# Patient Record
Sex: FEMALE | Race: WHITE | NOT HISPANIC OR LATINO | Employment: PART TIME | URBAN - METROPOLITAN AREA
[De-identification: names, ages, dates, MRNs, and addresses within clinical notes are randomized per-mention and may not be internally consistent; named-entity substitution may affect disease eponyms.]

---

## 2019-09-20 ENCOUNTER — OFFICE VISIT (OUTPATIENT)
Dept: RETAIL CLINIC | Facility: CLINIC | Age: 34
End: 2019-09-20

## 2019-09-20 VITALS
BODY MASS INDEX: 45.35 KG/M2 | WEIGHT: 231 LBS | HEART RATE: 107 BPM | DIASTOLIC BLOOD PRESSURE: 78 MMHG | TEMPERATURE: 98.1 F | SYSTOLIC BLOOD PRESSURE: 124 MMHG | RESPIRATION RATE: 24 BRPM | OXYGEN SATURATION: 98 % | HEIGHT: 60 IN

## 2019-09-20 DIAGNOSIS — L03.012 FELON OF FINGER OF LEFT HAND: Primary | ICD-10-CM

## 2019-09-20 PROCEDURE — 99213 OFFICE O/P EST LOW 20 MIN: CPT | Performed by: NURSE PRACTITIONER

## 2019-09-20 RX ORDER — KETOCONAZOLE 20 MG/G
CREAM TOPICAL
Refills: 0 | COMMUNITY
Start: 2019-08-21

## 2019-09-20 RX ORDER — LAMOTRIGINE 200 MG/1
200 TABLET ORAL DAILY
Refills: 0 | COMMUNITY
Start: 2019-08-29

## 2019-09-20 RX ORDER — CLINDAMYCIN PHOSPHATE 11.9 MG/ML
SOLUTION TOPICAL
Refills: 0 | COMMUNITY
Start: 2019-08-21

## 2019-09-20 RX ORDER — BENZTROPINE MESYLATE 1 MG/1
TABLET ORAL
Refills: 0 | COMMUNITY
Start: 2019-07-30

## 2019-09-20 RX ORDER — SULFAMETHOXAZOLE AND TRIMETHOPRIM 800; 160 MG/1; MG/1
1 TABLET ORAL 2 TIMES DAILY
Qty: 20 TABLET | Refills: 0 | Status: SHIPPED | OUTPATIENT
Start: 2019-09-20 | End: 2019-09-30

## 2019-09-20 RX ORDER — PROPRANOLOL HYDROCHLORIDE 20 MG/1
TABLET ORAL
Refills: 0 | COMMUNITY
Start: 2019-07-07

## 2019-09-20 RX ORDER — VENLAFAXINE HYDROCHLORIDE 150 MG/1
300 CAPSULE, EXTENDED RELEASE ORAL DAILY
Refills: 0 | COMMUNITY
Start: 2019-09-15

## 2019-09-20 RX ORDER — BUSPIRONE HYDROCHLORIDE 30 MG/1
TABLET ORAL
Refills: 0 | COMMUNITY
Start: 2019-09-15

## 2019-09-20 RX ORDER — OLANZAPINE 20 MG/1
TABLET ORAL
Refills: 0 | COMMUNITY
Start: 2019-09-15

## 2019-09-20 RX ORDER — CLONAZEPAM 0.5 MG/1
0.5 TABLET ORAL DAILY PRN
Refills: 0 | COMMUNITY
Start: 2019-08-29

## 2019-09-20 RX ORDER — HALOPERIDOL 2 MG/1
TABLET ORAL
Refills: 0 | COMMUNITY
Start: 2019-07-10

## 2019-09-20 RX ORDER — BUPROPION HYDROCHLORIDE 150 MG/1
TABLET ORAL
Refills: 0 | COMMUNITY
Start: 2019-09-15

## 2019-09-20 NOTE — PATIENT INSTRUCTIONS
Fingertip Infection  There are two main types of fingertip infections:  · Paronychia. This is an infection that happens around your nail. This type of infection can start suddenly in one nail or occur gradually over time and affect more than one nail. Long-term (chronic) paronychia can make your fingernails thick and deformed.  · Felon. This is a bacterial infection in the padded tip of your finger. Felon infection can cause a painful collection of pus (abscess) to form inside your fingertip. If the infection is not treated, the infection can spread as deep as the bone.  What are the causes?  Paronychia infection can be caused by bacteria, funguses, or a mix of both. Felon infection is usually caused by the bacteria that are normally found on your skin. An infection can develop if the bacteria spread through your skin to the pad of tissue inside your fingertip.  What increases the risk?  A fingertip infection is more likely to develop in people:  · Who have diabetes.  · Who have a weak body defense (immune) system.  · Who work with their hands.  · Whose hands are exposed to moisture, chemicals, or irritants for long periods of time.  · Who have poor circulation.  · Who bite, chew, or pick their fingernails.  What are the signs or symptoms?  Symptoms of paronychia may affect one or more fingernails and include:  · Pain, swelling, and redness around the nail.  · Pus-filled pockets at the base or side of the fingernail (cuticle).  · Thick fingernails that separate from the nail bed.  · Pus that drains from the nail bed.  Symptoms of felon usually affect just one fingertip pad and include:  · Severe, throbbing pain.  · Redness.  · Swelling.  · Warmth.  · Tenderness when the affected fingertip is touched.  How is this diagnosed?  A fingertip infection is diagnosed with a medical history and physical exam. If there is pus draining from the infection, it may be swabbed and sent to the lab for a culture. An X-ray may be  done to see if the infection has spread to the bone.  How is this treated?  Treatment for a fingertip infection may include:  · Warm water or salt-water soaks several times per day.  · Antibiotic medicine. This may be an ointment or pills.  · Steroid ointment.  · Antifungal pills.  · Drainage of pus pockets. This is done by making a surgical cut (incision) to open the fingertip to drain pus.  · Wearing gloves to protect your nails.  Follow these instructions at home:  Medicines  · Take or apply over-the-counter and prescription medicines only as told by your health care provider.  · If you were prescribed antibiotic medicine, take or apply it as told by your health care provider. Do not stop using the antibiotic even if your condition improves.  Wound care  · Clean the infected area each day with warm water or salt water, or as told by your health care provider.  ? Gently wash the infected area with mild soap and water.  ? Rinse the infected area with water to remove all soap.  ? Pat the infected area dry with a clean towel. Do not rub it.  ? To make a salt-water mixture, completely dissolve ½-1 tsp of salt in 1 cup of warm water.  · Follow instructions from your health care provider about:  ? How to take care of the infection.  ? When and how you should change your bandage (dressing).  ? When you should remove your dressing.  · Check the infected area every day for more signs of infection. Watch for:  ? More redness, swelling, or pain.  ? More fluid or blood.  ? Warmth.  ? A bad smell.  General instructions  · Keep the dressing dry until your health care provider says it can be removed. Do not take baths, swim, use a hot tub, or do anything that would put your wound underwater until your health care provider approves.  · Raise (elevate) the infected area above the level of your heart while you are sitting or lying down or as told by your health care provider.  · Do not scratch or pick at the infection.  · Wear  gloves as told by your health care provider, if this applies.  · Keep all follow-up visits as told by your health care provider. This is important.  How is this prevented?  · Wear gloves when you work with your hands.  · Wash your hands often with antibacterial soap.  · Avoid letting your hands stay wet or irritated for long periods of time.  · Do not bite your fingernails. Do not pull on your cuticles. Do not suck on your fingers.  · Use clean scissors or nail clippers to trim your nails. Do not cut your fingernails very short.  Contact a health care provider if:  · Your pain medicine is not helping.  · You have more redness, swelling, or pain at your fingertip.  · You continue to have fluid, blood, or pus coming from your fingertip.  · Your infection area feels warm to the touch.  · You continue to notice a bad smell coming from your fingertip or your dressing.  Get help right away if:  · The area of redness is spreading, or you notice a red streak going away from your fingertip.  · You have a fever.  This information is not intended to replace advice given to you by your health care provider. Make sure you discuss any questions you have with your health care provider.  Document Released: 01/25/2006 Document Revised: 05/25/2017 Document Reviewed: 06/06/2016  Elsevier Interactive Patient Education © 2019 [employer]

## 2019-09-21 NOTE — PROGRESS NOTES
Subjective   Klaudia Baltazar is a 34 y.o. female.   Chief Complaint   Patient presents with   • Hand Pain      35 yo w/f presents with complaint of painful left middle finger tip after injury to finger tip and breaking nail approx 7 days ago.  Reports  gradually becoming more red and painful.  She has applied antibiotic ointment without relief.  Refer to HPI/ROS for additional information.               Hand Pain    The incident occurred 5 to 7 days ago. The incident occurred at home. The injury mechanism is unknown. Pain location: left middle finger. The quality of the pain is described as aching. The pain does not radiate. The pain is at a severity of 6/10. The pain has been fluctuating since the incident. The symptoms are aggravated by palpation and movement (allowing left hand to hang down). She has tried acetaminophen for the symptoms. The treatment provided no relief.        The following portions of the patient's history were reviewed and updated as appropriate: allergies, current medications, past family history, past medical history, past social history, past surgical history and problem list.    Current Outpatient Medications:   •  benztropine (COGENTIN) 1 MG tablet, take 0.5-1 tablet by mouth twice a day if needed, Disp: , Rfl: 0  •  buPROPion XL (WELLBUTRIN XL) 150 MG 24 hr tablet, , Disp: , Rfl: 0  •  busPIRone (BUSPAR) 30 MG tablet, , Disp: , Rfl: 0  •  clindamycin (CLEOCIN T) 1 % external solution, APPY TO ACNE ON FACE, CHEST, AND BACK IN THE MORNING, Disp: , Rfl: 0  •  clonazePAM (KlonoPIN) 0.5 MG tablet, Take 0.5 mg by mouth Daily As Needed., Disp: , Rfl: 0  •  haloperidol (HALDOL) 2 MG tablet, , Disp: , Rfl: 0  •  ketoconazole (NIZORAL) 2 % cream, apply to chest twice a day for 7 days, Disp: , Rfl: 0  •  lamoTRIgine (LaMICtal) 200 MG tablet, Take 200 mg by mouth Daily., Disp: , Rfl: 0  •  OLANZapine (zyPREXA) 20 MG tablet, , Disp: , Rfl: 0  •  propranolol (INDERAL) 20 MG tablet, , Disp: , Rfl: 0  •   "sulfamethoxazole-trimethoprim (BACTRIM DS) 800-160 MG per tablet, Take 1 tablet by mouth 2 (Two) Times a Day for 10 days., Disp: 20 tablet, Rfl: 0  •  venlafaxine XR (EFFEXOR-XR) 150 MG 24 hr capsule, Take 300 mg by mouth Daily., Disp: , Rfl: 0    Review of Systems   Constitutional: Negative.    HENT: Negative.    Eyes: Negative.    Respiratory: Negative.    Cardiovascular: Negative.    Gastrointestinal: Negative.    Skin:        Finger tip   Psychiatric/Behavioral: Negative.    All other systems reviewed and are negative.    /78   Pulse 107   Temp 98.1 °F (36.7 °C)   Resp 24   Ht 152.4 cm (60\")   Wt 105 kg (231 lb)   SpO2 98%   BMI 45.11 kg/m²     Objective   No Known Allergies    Physical Exam   Constitutional: She is oriented to person, place, and time. She appears well-developed and well-nourished. No distress.   HENT:   Head: Normocephalic.   Eyes: Conjunctivae are normal.   Neck: Normal range of motion. Neck supple.   Cardiovascular: Normal rate, regular rhythm and normal heart sounds.   Pulmonary/Chest: Effort normal and breath sounds normal.   Neurological: She is alert and oriented to person, place, and time.   Skin: Capillary refill takes less than 2 seconds. She is not diaphoretic. There is erythema.   Left middle finger tender to palpation, distal tip is erythematous and edematous, more on palm side of finger.   Psychiatric: She has a normal mood and affect. Her behavior is normal. Judgment normal.   Vitals reviewed.      Assessment/Plan   Klaudia was seen today for hand pain.    Diagnoses and all orders for this visit:    Felon of finger of left hand    Other orders  -     sulfamethoxazole-trimethoprim (BACTRIM DS) 800-160 MG per tablet; Take 1 tablet by mouth 2 (Two) Times a Day for 10 days.           An After Visit Summary was printed, reviewed, and given to the patient. Understanding verbalized and agrees with treatment plan.  If no improvement or becomes worse, follow up with primary or " go to [facility]/ER.          September 21, 2019 9:43 AM

## 2020-01-14 ENCOUNTER — HOSPITAL ENCOUNTER (EMERGENCY)
Facility: HOSPITAL | Age: 35
Discharge: HOME | End: 2020-01-14
Attending: EMERGENCY MEDICINE | Admitting: EMERGENCY MEDICINE

## 2020-01-14 VITALS
TEMPERATURE: 98.1 F | SYSTOLIC BLOOD PRESSURE: 148 MMHG | OXYGEN SATURATION: 98 % | BODY MASS INDEX: 26.5 KG/M2 | DIASTOLIC BLOOD PRESSURE: 87 MMHG | HEIGHT: 60 IN | WEIGHT: 135 LBS | RESPIRATION RATE: 20 BRPM | HEART RATE: 121 BPM

## 2020-01-14 DIAGNOSIS — J02.0 STREP PHARYNGITIS: Primary | ICD-10-CM

## 2020-01-14 LAB
FLUAV AG NPH QL: NEGATIVE
FLUBV AG NPH QL IA: NEGATIVE
S PYO AG THROAT QL: POSITIVE

## 2020-01-14 PROCEDURE — 99283 EMERGENCY DEPT VISIT LOW MDM: CPT

## 2020-01-14 PROCEDURE — 96372 THER/PROPH/DIAG INJ SC/IM: CPT

## 2020-01-14 PROCEDURE — 87804 INFLUENZA ASSAY W/OPTIC: CPT

## 2020-01-14 PROCEDURE — 25010000002 DEXAMETHASONE PER 1 MG: Performed by: PHYSICIAN ASSISTANT

## 2020-01-14 PROCEDURE — 87880 STREP A ASSAY W/OPTIC: CPT | Performed by: EMERGENCY MEDICINE

## 2020-01-14 RX ORDER — DEXAMETHASONE SODIUM PHOSPHATE 10 MG/ML
10 INJECTION INTRAMUSCULAR; INTRAVENOUS ONCE
Status: COMPLETED | OUTPATIENT
Start: 2020-01-14 | End: 2020-01-14

## 2020-01-14 RX ORDER — AMOXICILLIN 500 MG/1
1000 CAPSULE ORAL 2 TIMES DAILY
Qty: 40 CAPSULE | Refills: 0 | Status: SHIPPED | OUTPATIENT
Start: 2020-01-14 | End: 2020-01-24

## 2020-01-14 RX ORDER — IBUPROFEN 800 MG/1
800 TABLET ORAL ONCE
Status: COMPLETED | OUTPATIENT
Start: 2020-01-14 | End: 2020-01-14

## 2020-01-14 RX ADMIN — DEXAMETHASONE SODIUM PHOSPHATE 10 MG: 10 INJECTION INTRAMUSCULAR; INTRAVENOUS at 17:10

## 2020-01-14 RX ADMIN — IBUPROFEN 800 MG: 800 TABLET ORAL at 14:45

## 2020-01-14 NOTE — ED PROVIDER NOTES
Subjective   Ms. Klaudia Baltazar is a 34 y.o female presenting to the emergency department with complaints of flu symptoms. She complains of sore throat, shortness of breath, body aches, and fever since yesterday. She has used Tylenol and Motrin all day but denies relief. She did receive her flu shot this season. She denies runny nose, congestion, sick contacts, recent antibiotics, and smoking. There are no other acute symptoms at this time.       History provided by:  Patient  Flu Symptoms   Presenting symptoms: cough, fever, shortness of breath and sore throat    Presenting symptoms: no diarrhea, no headaches, no rhinorrhea and no vomiting    Severity:  Moderate  Onset quality:  Sudden  Duration:  2 days  Progression:  Worsening  Chronicity:  New  Worsened by:  Nothing  Ineffective treatments: Tylenol.  Associated symptoms: no ear pain and no congestion        Review of Systems   Constitutional: Positive for fever.        Body aches   HENT: Positive for sore throat. Negative for congestion, ear pain and rhinorrhea.    Respiratory: Positive for cough and shortness of breath.    Cardiovascular: Negative for chest pain.   Gastrointestinal: Negative for diarrhea and vomiting.   Skin: Negative for rash.   Neurological: Negative for headaches.   All other systems reviewed and are negative.      Past Medical History:   Diagnosis Date   • Anxiety    • Bipolar disorder (CMS/HCC)    • Cancer (CMS/[facility])     skin cancer   • Depression        No Known Allergies    No past surgical history on file.    No family history on file.    Social History     Socioeconomic History   • Marital status:      Spouse name: Not on file   • Number of children: Not on file   • Years of education: Not on file   • Highest education level: Not on file   Tobacco Use   • Smoking status: Former Smoker     Types: Cigars     Last attempt to quit: 2017     Years since quittin.4   Substance and Sexual Activity   • Alcohol use: No     Frequency:  Never   • Drug use: No   • Sexual activity: Defer         Objective   Physical Exam   Constitutional: She is oriented to person, place, and time. She appears well-developed and well-nourished. No distress.   HENT:   Head: Normocephalic and atraumatic.   Mouth/Throat: Oropharyngeal exudate and posterior oropharyngeal edema present. No tonsillar abscesses.   Patent airway; airway is intact. Bilateral pharynx edema and exudates. No obvious peritonsillar abscess   Eyes: Pupils are equal, round, and reactive to light. Conjunctivae and EOM are normal. No scleral icterus.   Neck: Normal range of motion.   Cardiovascular: Regular rhythm. Tachycardia present.   No murmur heard.  Pulmonary/Chest: Effort normal and breath sounds normal. No respiratory distress. She has no wheezes.   Abdominal: Soft. There is no tenderness.   Musculoskeletal: Normal range of motion. She exhibits no edema or tenderness.   Neurological: She is alert and oriented to person, place, and time.   Skin: Skin is warm and dry. She is not diaphoretic.   Psychiatric: She has a normal mood and affect. Her behavior is normal.   Nursing note and vitals reviewed.      Procedures         ED Course     Positive Strep. Pt given dose of steroids in ED and discharged home on Amoxicillin.       Recent Results (from the past 24 hour(s))   Influenza Antigen, Rapid - Swab, Nasopharynx    Collection Time: 01/14/20  2:41 PM   Result Value Ref Range    Influenza A Ag, EIA Negative Negative    Influenza B Ag, EIA Negative Negative   Rapid Strep A Screen - Swab, Throat    Collection Time: 01/14/20  4:06 PM   Result Value Ref Range    Strep A Ag Positive (A) Negative     Note: In addition to lab results from this visit, the labs listed above may include labs taken at another facility or during a different encounter within the last 24 hours. Please correlate lab times with ED admission and discharge times for further clarification of the services performed during this  "visit.    No orders to display     Vitals:    01/14/20 1435 01/14/20 1730   BP: 150/91 148/87   BP Location: Right arm Right arm   Patient Position: Sitting Sitting   Pulse: (!) 130 (!) 121   Resp: 20 20   Temp: (!) 102.9 °F (39.4 °C) 98.1 °F (36.7 °C)   TempSrc: Oral Oral   SpO2: 97% 98%   Weight: 61.2 kg (135 lb)    Height: 152.4 cm (60\")      Medications   ibuprofen (ADVIL,MOTRIN) tablet 800 mg (800 mg Oral Given 1/14/20 1445)   dexamethasone (DECADRON) injection 10 mg (10 mg Intramuscular Given 1/14/20 1710)     ECG/EMG Results (last 24 hours)     ** No results found for the last 24 hours. **        No orders to display                                               MDM    Final diagnoses:   Strep pharyngitis       Documentation assistance provided by dominique Damico.  Information recorded by the scribe was done at my direction and has been verified and validated by me.     Margie Damico  01/14/20 8025       Liliana Tyson PA  01/14/20 4367    "

## 2020-01-14 NOTE — DISCHARGE INSTRUCTIONS
Follow up with one of the [facility] Primary Care Providers below to setup primary care. If you need assistance coordinating a primary care appointment with a [facility] Primary Care Provider, please contact the Primary Care Coordinators at [phone] for appointment scheduling.    [facility], Primary Care   [address] , [address]   [place], [place] [address] [phone]    [facility] Endocrinology  [address], [address]  [place] [address] [phone]    [facility]  [address], [address]   [place] [address] [phone]    [facility]  [address], [address]  [place], [place] [address] [phone]    [facility], Primary Care,   [address], [address]   [place], [place] [address] [phone]    [facility]  [address]  [place], [place] [id number] [phone]    [facility], Primary Care  [address], [address]  [place], [place] [address] [phone]    [facility]  [address], [address]   [place] [address] [phone]    [facility], Primary Care  [address] [place] [address] [phone]      [facility]  [address], [address]   [place] [address] [phone]    [facility]  [address] [place] [address] [phone]

## 2020-12-11 ENCOUNTER — CONSULT (OUTPATIENT)
Dept: ONCOLOGY | Facility: CLINIC | Age: 35
End: 2020-12-11

## 2020-12-11 ENCOUNTER — LAB (OUTPATIENT)
Dept: LAB | Facility: HOSPITAL | Age: 35
End: 2020-12-11

## 2020-12-11 VITALS
HEART RATE: 104 BPM | TEMPERATURE: 96.4 F | OXYGEN SATURATION: 99 % | HEIGHT: 61 IN | RESPIRATION RATE: 18 BRPM | SYSTOLIC BLOOD PRESSURE: 140 MMHG | WEIGHT: 217 LBS | DIASTOLIC BLOOD PRESSURE: 95 MMHG | BODY MASS INDEX: 40.97 KG/M2

## 2020-12-11 DIAGNOSIS — D75.839 THROMBOCYTOSIS: Primary | ICD-10-CM

## 2020-12-11 DIAGNOSIS — D75.839 THROMBOCYTOSIS: ICD-10-CM

## 2020-12-11 LAB
CRP SERPL-MCNC: 2.45 MG/DL (ref 0–0.5)
ERYTHROCYTE [DISTWIDTH] IN BLOOD BY AUTOMATED COUNT: 17.4 % (ref 12.3–15.4)
ERYTHROCYTE [SEDIMENTATION RATE] IN BLOOD: 34 MM/HR (ref 0–20)
FERRITIN SERPL-MCNC: 27.29 NG/ML (ref 13–150)
HCT VFR BLD AUTO: 40.9 % (ref 34–46.6)
HGB BLD-MCNC: 13.1 G/DL (ref 12–15.9)
IRON 24H UR-MRATE: 77 MCG/DL (ref 37–145)
IRON SATN MFR SERPL: 16 % (ref 20–50)
LYMPHOCYTES # BLD AUTO: 4.5 10*3/MM3 (ref 0.7–3.1)
LYMPHOCYTES NFR BLD AUTO: 39.4 % (ref 19.6–45.3)
MCH RBC QN AUTO: 29.3 PG (ref 26.6–33)
MCHC RBC AUTO-ENTMCNC: 32.1 G/DL (ref 31.5–35.7)
MCV RBC AUTO: 91.3 FL (ref 79–97)
MONOCYTES # BLD AUTO: 0.8 10*3/MM3 (ref 0.1–0.9)
MONOCYTES NFR BLD AUTO: 7.2 % (ref 5–12)
NEUTROPHILS NFR BLD AUTO: 53.4 % (ref 42.7–76)
NEUTROPHILS NFR BLD AUTO: 6 10*3/MM3 (ref 1.7–7)
PLATELET # BLD AUTO: 529 10*3/MM3 (ref 140–450)
PMV BLD AUTO: 7.4 FL (ref 6–12)
RBC # BLD AUTO: 4.48 10*6/MM3 (ref 3.77–5.28)
TIBC SERPL-MCNC: 492 MCG/DL (ref 298–536)
TRANSFERRIN SERPL-MCNC: 330 MG/DL (ref 200–360)
WBC # BLD AUTO: 11.3 10*3/MM3 (ref 3.4–10.8)

## 2020-12-11 PROCEDURE — 85652 RBC SED RATE AUTOMATED: CPT

## 2020-12-11 PROCEDURE — 84466 ASSAY OF TRANSFERRIN: CPT

## 2020-12-11 PROCEDURE — 86140 C-REACTIVE PROTEIN: CPT

## 2020-12-11 PROCEDURE — 99204 OFFICE O/P NEW MOD 45 MIN: CPT | Performed by: INTERNAL MEDICINE

## 2020-12-11 PROCEDURE — 85025 COMPLETE CBC W/AUTO DIFF WBC: CPT

## 2020-12-11 PROCEDURE — 83540 ASSAY OF IRON: CPT

## 2020-12-11 PROCEDURE — 82728 ASSAY OF FERRITIN: CPT

## 2020-12-11 PROCEDURE — 36415 COLL VENOUS BLD VENIPUNCTURE: CPT

## 2020-12-11 NOTE — PROGRESS NOTES
ID: 35 y.o. year old female from [place] [id number]    PCP: Provider, No Known    REFERRING PHYSICIAN: Cl Presley PA-C    Reason for Consultation: Thrombocytosis    Dear Mr. Presley    It is a pleasure to meet Ms. Baltazar today.  She is a very pleasant 35-year-old lady who presents today for consultation due to mild thrombocytosis.  She does have significant morbid obesity with a BMI of 41.  She is also on multiple bipolar drugs.  She otherwise seems to be in good health.  She has a history of a hysterectomy.  So she does not have heavy cycles.      Past Medical History:   Diagnosis Date   • Anxiety    • Bipolar disorder (CMS/HCC)    • Cancer (CMS/HCC)     skin cancer   • Depression    • Skin cancer 2017    Chest       Past Surgical History:   Procedure Laterality Date   •  SECTION      x3; 2002, , 2019   • SKIN CANCER EXCISION  2017    Chest       Social History     Socioeconomic History   • Marital status:      Spouse name: Not on file   • Number of children: Not on file   • Years of education: Not on file   • Highest education level: Not on file   Tobacco Use   • Smoking status: Former Smoker     Packs/day: 1.00     Types: Cigarettes     Quit date: 2017     Years since quitting: 3.3   • Smokeless tobacco: Never Used   Substance and Sexual Activity   • Alcohol use: No     Frequency: Never   • Drug use: Yes     Types: Marijuana     Comment: 4-5x/daily for 20 yrs prior to quitting in 2017   • Sexual activity: Defer       Family History   Problem Relation Age of Onset   • No Known Problems Mother    • Heart disease Father    • Ovarian cancer Maternal Grandmother        Review of Systems:    16 point review of systems was performed and reviewed and scanned into the EMR    Review of Systems - Oncology      Current Outpatient Medications:   •  benztropine (COGENTIN) 1 MG tablet, take 0.5-1 tablet by mouth twice a day if needed, Disp: , Rfl: 0  •  buPROPion XL (WELLBUTRIN XL) 150 MG 24 hr  tablet, , Disp: , Rfl: 0  •  busPIRone (BUSPAR) 30 MG tablet, , Disp: , Rfl: 0  •  clindamycin (CLEOCIN T) 1 % external solution, APPY TO ACNE ON FACE, CHEST, AND BACK IN THE MORNING, Disp: , Rfl: 0  •  clonazePAM (KlonoPIN) 0.5 MG tablet, Take 0.5 mg by mouth Daily As Needed., Disp: , Rfl: 0  •  haloperidol (HALDOL) 2 MG tablet, , Disp: , Rfl: 0  •  ketoconazole (NIZORAL) 2 % cream, apply to chest twice a day for 7 days, Disp: , Rfl: 0  •  lamoTRIgine (LaMICtal) 200 MG tablet, Take 200 mg by mouth Daily., Disp: , Rfl: 0  •  OLANZapine (zyPREXA) 20 MG tablet, , Disp: , Rfl: 0  •  propranolol (INDERAL) 20 MG tablet, , Disp: , Rfl: 0  •  venlafaxine XR (EFFEXOR-XR) 150 MG 24 hr capsule, Take 300 mg by mouth Daily., Disp: , Rfl: 0    Pain Medications             buPROPion XL (WELLBUTRIN XL) 150 MG 24 hr tablet     clonazePAM (KlonoPIN) 0.5 MG tablet Take 0.5 mg by mouth Daily As Needed.    haloperidol (HALDOL) 2 MG tablet     lamoTRIgine (LaMICtal) 200 MG tablet Take 200 mg by mouth Daily.    OLANZapine (zyPREXA) 20 MG tablet     venlafaxine XR (EFFEXOR-XR) 150 MG 24 hr capsule Take 300 mg by mouth Daily.           No Known Allergies    ECOG SCORE: 0    Objective     Vitals:    12/11/20 1056   BP: 140/95   Pulse: 104   Resp: 18   Temp: 96.4 °F (35.8 °C)   SpO2: 99%     Body mass index is 41 kg/m².  Body surface area is 1.96 meters squared.        12/11/20  1056   Weight: 98.4 kg (217 lb)     Pain Score    12/11/20 1056   PainSc: 0-No pain          Physical Exam    General: well appearing, in no acute distress  HEENT: sclera anicteric, oropharynx clear, neck is supple  Lymphatics: no cervical, supraclavicular, or axillary adenopathy  Cardiovascular: regular rate and rhythm, no murmurs, rubs or gallops  Lungs: clear to auscultation bilaterally  Abdomen: soft, nontender, nondistended.  No palpable organomegaly  Extremities: no lower extremity edema  Skin: no rashes, lesions, bruising, or petechiae  Msk:  Shows no weakness  of the large muscle groups  Psych: Mood is stable        Lab Results   Component Value Date    HGB 13.1 12/11/2020    HCT 40.9 12/11/2020    MCV 91.3 12/11/2020     (H) 12/11/2020    WBC 11.30 (H) 12/11/2020    NEUTROABS 6.00 12/11/2020    LYMPHSABS 4.50 (H) 12/11/2020    MONOSABS 0.80 12/11/2020             Assessment/Plan      1.  Thrombocytosis.  I suspect a reactive process in this situation.  She is on chronic PPI which could give her iron deficiency.  I will check for that to see if it is present.  Iron deficiency is one of the most common causes of mild thrombocytosis.  The other possibility is an underlying inflammatory state.  With her morbid obesity that would also play a role.  I will rule out a myeloproliferative disorder which is fairly rare in this age group just to make sure she does not need anything else.  In this situation I think observation is all I would  recommend.  I will repeat her CBC in 6 months and see how she is doing.  I recommended a weight loss plan for her.  Her BMI is 41 and she ideally should be at least less than 30.  That would give her a goal of 150 pounds at 5 foot 1.        Thank you for allowing me to participate in the care of this patient.    Yours sincerely,    Jacklyn Velasquez MD  [facility]  Hematology and Oncology         Orders Placed This Encounter   Procedures   • Iron Profile     Standing Status:   Future     Number of Occurrences:   1     Standing Expiration Date:   12/11/2021   • Ferritin     Standing Status:   Future     Number of Occurrences:   1     Standing Expiration Date:   12/11/2021   • JAK2 [id number] Qual w/Reflex to CALR Mutation ([facility] only)     Standing Status:   Future     Number of Occurrences:   1     Standing Expiration Date:   12/11/2021   • Sedimentation Rate     Standing Status:   Future     Number of Occurrences:   1     Standing Expiration Date:   12/11/2021   • C-reactive Protein     Standing Status:   Future      Number of Occurrences:   1     Standing Expiration Date:   12/11/2021   • CBC & Differential     Standing Status:   Future     Number of Occurrences:   1     Standing Expiration Date:   12/11/2021     Order Specific Question:   Manual Differential     Answer:   No   • CBC & Differential     Standing Status:   Future     Number of Occurrences:   1     Standing Expiration Date:   12/11/2021     Order Specific Question:   Manual Differential     Answer:   No

## 2020-12-18 LAB — REF LAB TEST METHOD: NORMAL
